# Patient Record
Sex: FEMALE | Race: BLACK OR AFRICAN AMERICAN | NOT HISPANIC OR LATINO | Employment: UNEMPLOYED | URBAN - METROPOLITAN AREA
[De-identification: names, ages, dates, MRNs, and addresses within clinical notes are randomized per-mention and may not be internally consistent; named-entity substitution may affect disease eponyms.]

---

## 2017-08-12 ENCOUNTER — GENERIC CONVERSION - ENCOUNTER (OUTPATIENT)
Dept: OTHER | Facility: OTHER | Age: 27
End: 2017-08-12

## 2017-08-23 ENCOUNTER — GENERIC CONVERSION - ENCOUNTER (OUTPATIENT)
Dept: OTHER | Facility: OTHER | Age: 27
End: 2017-08-23

## 2017-08-28 ENCOUNTER — GENERIC CONVERSION - ENCOUNTER (OUTPATIENT)
Dept: OTHER | Facility: OTHER | Age: 27
End: 2017-08-28

## 2017-08-30 ENCOUNTER — ALLSCRIPTS OFFICE VISIT (OUTPATIENT)
Dept: PERINATAL CARE | Facility: OTHER | Age: 27
End: 2017-08-30
Payer: COMMERCIAL

## 2017-08-30 ENCOUNTER — GENERIC CONVERSION - ENCOUNTER (OUTPATIENT)
Dept: OTHER | Facility: OTHER | Age: 27
End: 2017-08-30

## 2017-08-30 PROCEDURE — 76811 OB US DETAILED SNGL FETUS: CPT | Performed by: OBSTETRICS & GYNECOLOGY

## 2017-08-31 ENCOUNTER — GENERIC CONVERSION - ENCOUNTER (OUTPATIENT)
Dept: OTHER | Facility: OTHER | Age: 27
End: 2017-08-31

## 2017-09-15 ENCOUNTER — GENERIC CONVERSION - ENCOUNTER (OUTPATIENT)
Dept: OTHER | Facility: OTHER | Age: 27
End: 2017-09-15

## 2017-09-18 ENCOUNTER — GENERIC CONVERSION - ENCOUNTER (OUTPATIENT)
Dept: OTHER | Facility: OTHER | Age: 27
End: 2017-09-18

## 2018-01-12 NOTE — MISCELLANEOUS
Message  received call from [facility] at [facility] fetal heart program  requested report from [facility] to be forwarded to Dr Andrey Mohs  report received and forwarded to Dr Andrey Mohs at [facility] office for Dr Andrey Mohs to call Lemon Goltz  Active Problems    1  Encounter for screening for risk of pre-term labor (V28 82) (Z36)   2   Fetal cardiac anomaly complicating pregnancy, antepartum (763 23) (O35 8XX0)    Signatures   Electronically signed by : Yessenia Roberts, ; Sep 18 2017  1:15PM EST                       (Author)

## 2018-01-16 NOTE — PROGRESS NOTES
AUG 30 2017         RE: Maranda Andres                                To: Campos JOSIAH Langford    MR#: [id number]                                  [address]   : [address], [address]   SS#:                                              Fax: Cecille Guerra: [phone]   (Exam #: [id number])      The LMP of this 28year old,  Sukumar Mistry, P6-0-0-6 patient was unknown, her   working JUDY is  and the current gestational age is 24 weeks 5   days by [address] [place] [address]  A sonographic examination was performed on AUG   30 2017 using real time equipment  The ultrasound examination was   performed using abdominal technique  The patient has a BMI of 25 7  Earliest ultrasound found in her record: MFM 17  19w5d  JUDY 18      Cardiac motion was observed at 151 bpm       INDICATIONS      confirm gestational age      Exam Types      LEVEL II      RESULTS      Fetus # 1 of 1   Variable presentation   Placenta Location = Posterior   No placenta previa   Placenta Grade = 0      MEASUREMENTS (* Included In Average GA)      AC              14 2 cm        19 weeks 1 day * (43%)   BPD              4 5 cm        19 weeks 5 days* (52%)   HC              17 2 cm        19 weeks 4 days* (46%)   Femur            3 3 cm        20 weeks 3 days* (54%)      Nuchal Fold      4 9 mm   NBL              5 1 mm      Humerus          2 9 cm        19 weeks 4 days  (47%)      Cerebellum       2 0 cm        20 weeks 1 day   Biorbit          3 1 cm        20 weeks 1 day   CisternaMagna    3 4 mm      HC/AC           1 21   FL/AC           0 23   FL/BPD          0 72   EFW (Ac/Fl/Hc)   313 grams - 0 lbs 11 oz      THE AVERAGE GESTATIONAL AGE is 19 weeks 5 days +/- 10 days        AMNIOTIC FLUID         Largest Vertical Pocket = 6 9 cm   Amniotic Fluid: Normal      ANATOMY      Head                                    Normal   Face/Neck Normal   Th  Cav  Normal   Heart                                   Abnormal   Abd  Cav  Normal   Stomach                                 Normal   Right Kidney                            Normal   Left Kidney                             Normal   Bladder                                 Normal   Abd  Wall                               Normal   Spine                                   Normal   Extrems                                 See Details   Genitalia                               Normal   Placenta                                Normal   Umbl  Cord                              Normal   Uterus                                  Normal   PCI                                     Normal      ANATOMY DETAILS      Visualized Appearing Sonographically Normal:   HEAD: (Calvarium, BPD Level, Cavum, Lateral Ventricles, Choroid Plexus,   Cerebellum, Cisterna Magna);    FACE/NECK: (Neck, Nuchal Fold, Profile,   Orbits, Nose/Lips, Palate, Face);    TH  CAV  : (Lungs, Diaphragm); HEART: (Proximal Right Outflow, Ductal Arch, IVC, SVC, Cardiac Axis,   Cardiac Position);    ABD  CAV : (Liver);    STOMACH, RIGHT KIDNEY, LEFT   KIDNEY, BLADDER, ABD  WALL, SPINE: (Cervical Spine, Thoracic Spine, Lumbar   Spine, Sacrum);    EXTREMS: (Lt Humerus, Rt Humerus, Lt Forearm, Rt   Forearm, Lt Hand, Rt Hand, Lt Femur, Rt Femur, Lt Low Leg, Rt Low Leg);      GENITALIA, PLACENTA, UMBL  CORD, UTERUS, PCI      Visualized:   EXTREMS: (Lt Foot, Rt Foot)      Not Visualized:   HEART: (3VV, Short [place] of Greater Vessels, Aortic Arch, Interventricular   Septum, Interatrial Septum)      Abnormal:   HEART: (Four Chamber View, Proximal Left Outflow, 3 Vessel Trachea)      ADNEXA      The left ovary appeared normal and measured 3 1 x 3 0 x 2 0 cm with a   volume of 9 7 cc  The right ovary appeared normal and measured 3 7 x 2 8 x   1 9 cm with a volume of 10 3 cc        HARVINDER Elder IUP   19 weeks and 5 days by this ultrasound  (JUDY=2018)   19 weeks and 5 days by 2nd Trimester Sono  (JUDY=2018)   Variable presentation   Regular fetal heart rate of 151 bpm   Hypoplastic left ventricle   Posterior placenta   No placenta previa      CONSULT COMMENT      Thank you very much for your kind referral of Bhupendra Chacko to the   [address] [place] [address] on 2017 for level II   ultrasound evaluation and M consult  Sharath Pearson is a 25-year-old    female  7 para 2811 with uncertain menstrual dating  She presents   for fetal ultrasound evaluation to evaluate gestational age, with   consultation performed for the indication of a suspected fetal cardiac   abnormality  Sharath Pearson does not speaking [place]  Her native language is   [place]  She declined use of the telephone  service for   discussion today, instead opting for interpretation by her   Her   prenatal course so far has been unremarkable  She has no complaints  Sharath Pearson reports fetal movement and denies vaginal bleeding  Sharath Pearson has a history of 6 prior vaginal deliveries at term following   apparently uncomplicated prenatal courses  Her children range in age from   one year to 15years old  Each of her children is currently healthy  Her   past medical and surgical histories are unremarkable  She currently takes   no medication with the exception of a prenatal vitamin on a daily basis   and has no known drug allergy  The family genetic history is   noncontributory  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  There is no forward flow   into a small, slit-like left ventricle  The right ventricle is normal in   appearance  The left ventricular outflow tract appears small  The RVOT   appears normal  The ductus arteriosus appears normal  There is no reverse   flow noted within the ductus    No fetal structural abnormality or   ultrasound marker for aneuploidy is otherwise identified on the Level II   ultrasound study today  Several cardiac views, along with the distal lower   extremities, are suboptimally imaged secondary to constraints related to   unfavorable fetal position  The predicted estimated due date based upon   today's study is 2018  The amniotic fluid volume is normal  A   collection of mixed echogenicities beneath the membrane anteriorly is   identified measuring 78 x 22 x 59 mm in size, likely representing a   resolving subchorionic bleed  The posterior placenta is otherwise normal   in appearance  Today's ultrasound findings and suggested follow-up were discussed in   detail with Clint Hogan and her   We have arranged an appointment for   fetal echocardiography with Pediatric Cardiology at the [address] next week  We discussed that hypoplastic left   heart syndrome is a lethal condition, and will require multi-stage surgery   for treatment after delivery  This is a ductal dependent lesion  Optimally, Clint Hogan will deliver her baby in the Special Delivery Unit at   [address] for proximity to all necessary caregivers immediately after birth  She will return for follow-up assessment of fetal growth and MFM   evaluation in the [facility], [facility]  at [facility] in 4 weeks  Additional recommendations will depend, in part, upon the findings at   fetal echocardiography in [place] next week  The finding of a large   subchorionic bleed is associated with an increased risk for adverse   pregnancy outcomes including  delivery, fetal growth restriction,   vaginal bleeding, and other manifestations related to abnormal placental   function  The face to face time, in addition to time spent discussing ultrasound   results, was approximately 30 minutes, greater than 50% of which was spent   during counseling and coordination of care  ANDREAS Guo M D     Maternal-Fetal Medicine Electronically signed 08/31/17 09:41

## 2018-01-17 NOTE — MISCELLANEOUS
Provider Comments  Provider Comments:   Jimbo Perry did not show up for her scheduled Level I ultrasound appointment in the [facility], [facility]  in [facility] on Monday morning, September 18, 2017, at 8:30 AM  Additionally, she did not call to reschedule this appointment        Signatures   Electronically signed by : JOSIAH Montoya ; Sep 18 2017  9:35AM EST                       (Author)

## 2019-01-08 ENCOUNTER — HOSPITAL ENCOUNTER (EMERGENCY)
Facility: HOSPITAL | Age: 29
Discharge: HOME | End: 2019-01-09
Attending: EMERGENCY MEDICINE | Admitting: EMERGENCY MEDICINE
Payer: COMMERCIAL

## 2019-01-08 DIAGNOSIS — R03.0 ELEVATED BLOOD PRESSURE READING: ICD-10-CM

## 2019-01-08 DIAGNOSIS — D64.9 ANEMIA: ICD-10-CM

## 2019-01-08 DIAGNOSIS — E87.6 HYPOKALEMIA: Primary | ICD-10-CM

## 2019-01-08 DIAGNOSIS — M79.10 MYALGIA: ICD-10-CM

## 2019-01-08 DIAGNOSIS — R00.2 PALPITATIONS: ICD-10-CM

## 2019-01-08 LAB
BASOPHILS # BLD AUTO: 0 THOUSANDS/ΜL (ref 0–0.1)
BASOPHILS NFR BLD AUTO: 1 % (ref 0–1)
EOSINOPHIL # BLD AUTO: 0.3 THOUSAND/ΜL (ref 0–0.4)
EOSINOPHIL NFR BLD AUTO: 4 % (ref 0–6)
ERYTHROCYTE [DISTWIDTH] IN BLOOD BY AUTOMATED COUNT: 20.9 %
HCT VFR BLD AUTO: 30.9 % (ref 36–46)
HGB BLD-MCNC: 10.2 G/DL (ref 12–16)
LYMPHOCYTES # BLD AUTO: 1.9 THOUSANDS/ΜL (ref 0.5–4)
LYMPHOCYTES NFR BLD AUTO: 29 % (ref 25–45)
MCH RBC QN AUTO: 25.4 PG (ref 26–34)
MCHC RBC AUTO-ENTMCNC: 33 G/DL (ref 31–36)
MCV RBC AUTO: 77 FL (ref 80–100)
MONOCYTES # BLD AUTO: 0.6 THOUSAND/ΜL (ref 0.2–0.9)
MONOCYTES NFR BLD AUTO: 9 % (ref 1–10)
NEUTROPHILS # BLD AUTO: 3.8 THOUSANDS/ΜL (ref 1.8–7.8)
NEUTS SEG NFR BLD AUTO: 58 % (ref 45–65)
PLATELET # BLD AUTO: 221 THOUSANDS/UL (ref 150–450)
PMV BLD AUTO: 8.6 FL (ref 8.9–12.7)
RBC # BLD AUTO: 4 MILLION/UL (ref 4–5.2)
WBC # BLD AUTO: 6.6 THOUSAND/UL (ref 4.5–11)

## 2019-01-08 PROCEDURE — 85025 COMPLETE CBC W/AUTO DIFF WBC: CPT | Performed by: EMERGENCY MEDICINE

## 2019-01-08 PROCEDURE — 36415 COLL VENOUS BLD VENIPUNCTURE: CPT | Performed by: EMERGENCY MEDICINE

## 2019-01-08 PROCEDURE — 87502 INFLUENZA DNA AMP PROBE: CPT | Performed by: EMERGENCY MEDICINE

## 2019-01-08 PROCEDURE — 80053 COMPREHEN METABOLIC PANEL: CPT | Performed by: EMERGENCY MEDICINE

## 2019-01-08 PROCEDURE — 93005 ELECTROCARDIOGRAM TRACING: CPT

## 2019-01-08 PROCEDURE — 84443 ASSAY THYROID STIM HORMONE: CPT | Performed by: EMERGENCY MEDICINE

## 2019-01-08 PROCEDURE — 82550 ASSAY OF CK (CPK): CPT | Performed by: EMERGENCY MEDICINE

## 2019-01-08 PROCEDURE — 99285 EMERGENCY DEPT VISIT HI MDM: CPT

## 2019-01-08 PROCEDURE — 84484 ASSAY OF TROPONIN QUANT: CPT | Performed by: EMERGENCY MEDICINE

## 2019-01-08 RX ORDER — LABETALOL HYDROCHLORIDE 5 MG/ML
INJECTION, SOLUTION INTRAVENOUS
Status: COMPLETED
Start: 2019-01-08 | End: 2019-01-08

## 2019-01-08 RX ORDER — LABETALOL 20 MG/4 ML (5 MG/ML) INTRAVENOUS SYRINGE
10 ONCE
Status: COMPLETED | OUTPATIENT
Start: 2019-01-08 | End: 2019-01-09

## 2019-01-08 RX ORDER — ACETAMINOPHEN 325 MG/1
650 TABLET ORAL ONCE
Status: COMPLETED | OUTPATIENT
Start: 2019-01-08 | End: 2019-01-08

## 2019-01-08 RX ADMIN — LABETALOL HYDROCHLORIDE 10 MG: 5 INJECTION INTRAVENOUS at 23:39

## 2019-01-08 RX ADMIN — ACETAMINOPHEN 650 MG: 325 TABLET ORAL at 23:15

## 2019-01-09 VITALS
RESPIRATION RATE: 19 BRPM | DIASTOLIC BLOOD PRESSURE: 83 MMHG | WEIGHT: 130 LBS | OXYGEN SATURATION: 99 % | SYSTOLIC BLOOD PRESSURE: 137 MMHG | HEART RATE: 76 BPM | HEIGHT: 65 IN | TEMPERATURE: 97 F | BODY MASS INDEX: 21.66 KG/M2

## 2019-01-09 LAB
ALBUMIN SERPL BCP-MCNC: 3.7 G/DL (ref 3–5.2)
ALP SERPL-CCNC: 46 U/L (ref 43–122)
ALT SERPL W P-5'-P-CCNC: 13 U/L (ref 9–52)
ANION GAP SERPL CALCULATED.3IONS-SCNC: 9 MMOL/L (ref 5–14)
ANISOCYTOSIS BLD QL SMEAR: PRESENT
AST SERPL W P-5'-P-CCNC: 17 U/L (ref 14–36)
ATRIAL RATE: 72 BPM
BILIRUB SERPL-MCNC: 0.2 MG/DL
BILIRUB UR QL STRIP: NEGATIVE
BUN SERPL-MCNC: 7 MG/DL (ref 5–25)
CALCIUM SERPL-MCNC: 8.9 MG/DL (ref 8.4–10.2)
CHLORIDE SERPL-SCNC: 105 MMOL/L (ref 97–108)
CK SERPL-CCNC: 119 U/L (ref 30–135)
CLARITY UR: CLEAR
CO2 SERPL-SCNC: 22 MMOL/L (ref 22–30)
COLOR UR: ABNORMAL
CREAT SERPL-MCNC: 0.63 MG/DL (ref 0.6–1.2)
FLUAV + FLUBV RNA ISLT NAA+PROBE: NOT DETECTED
FLUAV + FLUBV RNA ISLT NAA+PROBE: NOT DETECTED
GFR SERPL CREATININE-BSD FRML MDRD: 140 ML/MIN/1.73SQ M
GLUCOSE SERPL-MCNC: 91 MG/DL (ref 70–99)
GLUCOSE UR STRIP-MCNC: ABNORMAL MG/DL
HGB UR QL STRIP.AUTO: NEGATIVE
KETONES UR STRIP-MCNC: NEGATIVE MG/DL
LEUKOCYTE ESTERASE UR QL STRIP: NEGATIVE
NITRITE UR QL STRIP: NEGATIVE
P AXIS: 54 DEGREES
PH UR STRIP.AUTO: 7 [PH] (ref 4.5–8)
PLATELET BLD QL SMEAR: ADEQUATE
POTASSIUM SERPL-SCNC: 3.1 MMOL/L (ref 3.6–5)
PR INTERVAL: 196 MS
PROT SERPL-MCNC: 7.1 G/DL (ref 5.9–8.4)
PROT UR STRIP-MCNC: NEGATIVE MG/DL
QRS AXIS: 57 DEGREES
QRSD INTERVAL: 90 MS
QT INTERVAL: 398 MS
QTC INTERVAL: 435 MS
RBC MORPH BLD: NORMAL
SODIUM SERPL-SCNC: 136 MMOL/L (ref 137–147)
SP GR UR STRIP.AUTO: 1 (ref 1–1.04)
T WAVE AXIS: 23 DEGREES
TROPONIN I SERPL-MCNC: <0.01 NG/ML (ref 0–0.03)
TSH SERPL DL<=0.05 MIU/L-ACNC: 1.15 UIU/ML (ref 0.47–4.68)
UROBILINOGEN UA: NEGATIVE MG/DL
VENTRICULAR RATE: 72 BPM

## 2019-01-09 PROCEDURE — 87147 CULTURE TYPE IMMUNOLOGIC: CPT | Performed by: EMERGENCY MEDICINE

## 2019-01-09 PROCEDURE — 96361 HYDRATE IV INFUSION ADD-ON: CPT

## 2019-01-09 PROCEDURE — 93010 ELECTROCARDIOGRAM REPORT: CPT | Performed by: INTERNAL MEDICINE

## 2019-01-09 PROCEDURE — 96374 THER/PROPH/DIAG INJ IV PUSH: CPT

## 2019-01-09 PROCEDURE — 87086 URINE CULTURE/COLONY COUNT: CPT | Performed by: EMERGENCY MEDICINE

## 2019-01-09 PROCEDURE — 81003 URINALYSIS AUTO W/O SCOPE: CPT | Performed by: EMERGENCY MEDICINE

## 2019-01-09 RX ORDER — POTASSIUM CHLORIDE 750 MG/1
40 TABLET, EXTENDED RELEASE ORAL ONCE
Status: COMPLETED | OUTPATIENT
Start: 2019-01-09 | End: 2019-01-09

## 2019-01-09 RX ADMIN — LABETALOL 20 MG/4 ML (5 MG/ML) INTRAVENOUS SYRINGE 10 MG: at 00:21

## 2019-01-09 RX ADMIN — POTASSIUM CHLORIDE 40 MEQ: 10 TABLET, EXTENDED RELEASE ORAL at 00:54

## 2019-01-09 RX ADMIN — SODIUM CHLORIDE 1000 ML: 9 INJECTION, SOLUTION INTRAVENOUS at 00:00

## 2019-01-09 NOTE — DISCHARGE INSTRUCTIONS
Please follow up with OBGYN as directed tomorrow at 13:00 for further care, if your symptoms worsen please return to the emergency department  Anemia   WHAT YOU NEED TO KNOW:   Anemia is a low number of red blood cells or a low amount of hemoglobin in your red blood cells  Hemoglobin is a protein that helps carry oxygen throughout your body  Red blood cells use iron to create hemoglobin  Anemia may develop if your body does not have enough iron  It may also develop if your body does not make enough red blood cells or they die faster than your body can make them  DISCHARGE INSTRUCTIONS:   Call 911 or have someone call 911 for any of the following:   · You lose consciousness  · You have severe chest pain  Return to the emergency department if:   · You have dark or bloody bowel movements  Contact your healthcare provider if:   · Your symptoms are worse, even after treatment  · You have questions or concerns about your condition or care  Medicines:   · Iron or folic acid supplements  help increase your red blood cell and hemoglobin levels  · Vitamin B12 injections  may help boost your red blood cell level and decrease your symptoms  Ask your healthcare provider how to inject B12  · Take your medicine as directed  Contact your healthcare provider if you think your medicine is not helping or if you have side effects  Tell him of her if you are allergic to any medicine  Keep a list of the medicines, vitamins, and herbs you take  Include the amounts, and when and why you take them  Bring the list or the pill bottles to follow-up visits  Carry your medicine list with you in case of an emergency  Prevent anemia:  Eat healthy foods rich in iron and vitamin C  Nuts, meat, dark leafy green vegetables, and beans are high in iron and protein  Vitamin C helps your body absorb iron  Foods rich in vitamin C include oranges and other citrus fruits   Ask your healthcare provider for a list of other foods that are high in iron or vitamin C  Ask if you need to be on a special diet  Follow up with your healthcare provider as directed:  Write down your questions so you remember to ask them during your visits  © 2017 [employer]  Information is for End User's use only and may not be sold, redistributed or otherwise used for commercial purposes  All illustrations and images included in CareNotes® are the copyrighted property of A D A M , Inc  or [employer]  The above information is an  only  It is not intended as medical advice for individual conditions or treatments  Talk to your doctor, nurse or pharmacist before following any medical regimen to see if it is safe and effective for you  Heart Palpitations   WHAT YOU NEED TO KNOW:   Heart palpitations are feelings that your heart races, jumps, throbs, or flutters  You may feel extra beats, no beats for a short time, or skipped beats  You may have these feelings in your chest, throat, or neck  They may happen when you are sitting, standing, or lying  Heart palpitations may be frightening, but are usually not caused by a serious problem  DISCHARGE INSTRUCTIONS:   Call 911 or have someone else call for any of the following:   · You have any of the following signs of a heart attack:      ¨ Squeezing, pressure, or pain in your chest that lasts longer than 5 minutes or returns    ¨ Discomfort or pain in your back, neck, jaw, stomach, or arm     ¨ Trouble breathing    ¨ Nausea or vomiting    ¨ Lightheadedness or a sudden cold sweat, especially with chest pain or trouble breathing    · You have any of the following signs of a stroke:      ¨ Numbness or drooping on one side of your face     ¨ Weakness in an arm or leg    ¨ Confusion or difficulty speaking    ¨ Dizziness, a severe headache, or vision loss    · You faint or lose consciousness    Return to the emergency department if:   · Your palpitations happen more often or get more intense  Contact your healthcare provider if:   · You have new or worsening swelling in your feet or ankles  · You have questions or concerns about your condition or care  Follow up with your healthcare provider as directed: You may need to follow up with a cardiologist  Vitayl Geller may need tests to check for heart problems that cause palpitations  Write down your questions so you remember to ask them during your visits  Keep a record:  Write down when your palpitations start and stop, what you were doing when they started, and your symptoms  Keep track of what you ate or drank within a few hours of your palpitations  Include anything that seemed to help your symptoms, such as lying down or holding your breath  This record will help you and your healthcare provider learn what triggers your palpitations  Bring this record with you to your follow up visits  Help prevent heart palpitations:   · Manage stress and anxiety  Find ways to relax such as listening to music, meditating, or doing yoga  Exercise can also help decrease stress and anxiety  Talk to someone you trust about your stress or anxiety  You can also talk to a therapist      · Get plenty of sleep every night  Ask your healthcare provider how much sleep you need each night  · Do not drink caffeine or alcohol  Caffeine and alcohol can make your palpitations worse  Caffeine is found in soda, coffee, tea, chocolate, and drinks that increase your energy  · Do not smoke  Nicotine and other chemicals in cigarettes and cigars may damage your heart and blood vessels  Ask your healthcare provider for information if you currently smoke and need help to quit  E-cigarettes or smokeless tobacco still contain nicotine  Talk to your healthcare provider before you use these products  · Do not use illegal drugs  Talk to your healthcare provider if you use illegal drugs and want help to quit    © 2017 [employer] Information is for End User's use only and may not be sold, redistributed or otherwise used for commercial purposes  All illustrations and images included in CareNotes® are the copyrighted property of A D A M , Inc  or [employer] Hernandez  The above information is an  only  It is not intended as medical advice for individual conditions or treatments  Talk to your doctor, nurse or pharmacist before following any medical regimen to see if it is safe and effective for you

## 2019-01-09 NOTE — ED PROVIDER NOTES
History  Chief Complaint   Patient presents with    Vomiting     Per EMS call came in as chest pain, but her son called who is 15years old stating mother is vomiting and is 3 months pregnant  Per EMS this is child #8   Chest Pain     74-year-old female  at approximately 16 weeks gestation presents for evaluation of multiple symptoms including chest pain, headache, palpitations, nausea vomiting, bilateral thigh discomfort  Patient is a poor historian and history provided via [employer]   Despite that the patient was not answering some of the directed questions and seemed to be vague about his symptoms     She states that the symptoms started approximately 3 days ago have been constant without any relieving exacerbating factors  No similar symptoms in the past although patient states that she has been nauseous and been having vomiting up to 3 to 4 times a day her entire pregnancy  She has been able to tolerate some oral intake, weight gain  No fevers at home and no known sick contacts  She did not take any medications for this prior to arrival   Denies crying abdominal pain, vaginal bleeding or discharge, frequency urgency dysuria     Patient moved to the [place] in October and has not seen an OBGYN here for this pregnancy  She states she does have a history of high blood pressure but does not take any medications for this  No preeclampsia or problems in her previous pregnancies    She does have 1 miscarriage in the past otherwise uneventful pregnancies        Vomiting   Associated symptoms: myalgias    Associated symptoms: no abdominal pain, no cough, no diarrhea, no fever, no headaches and no sore throat    Chest Pain   Associated symptoms: nausea and vomiting    Associated symptoms: no abdominal pain, no back pain, no cough, no dizziness, no fever, no headache, no palpitations and no weakness        None       Past Medical History:   Diagnosis Date    Hypertension        History reviewed  No pertinent surgical history  History reviewed  No pertinent family history  I have reviewed and agree with the history as documented  Social History   Substance Use Topics    Smoking status: Never Smoker    Smokeless tobacco: Never Used    Alcohol use No        Review of Systems   Constitutional: Negative for appetite change and fever  HENT: Negative for rhinorrhea and sore throat  Eyes: Negative for photophobia and visual disturbance  Respiratory: Negative for cough, chest tightness and wheezing  Cardiovascular: Positive for chest pain  Negative for palpitations and leg swelling  Gastrointestinal: Positive for nausea and vomiting  Negative for abdominal distention, abdominal pain, blood in stool, constipation and diarrhea  Genitourinary: Negative for dysuria, flank pain, frequency, hematuria and urgency  Musculoskeletal: Positive for myalgias  Negative for back pain  Skin: Negative for rash  Neurological: Negative for dizziness, weakness and headaches  All other systems reviewed and are negative  Physical Exam  Physical Exam   Constitutional: She is oriented to person, place, and time  She appears well-developed and well-nourished  HENT:   Head: Normocephalic and atraumatic  Eyes: Pupils are equal, round, and reactive to light  Conjunctivae and EOM are normal    Neck: Normal range of motion  Neck supple  Cardiovascular: Normal rate and regular rhythm  Exam reveals no gallop and no friction rub  No murmur heard  Pulmonary/Chest: Effort normal  No respiratory distress  She has no wheezes  She has no rales  She exhibits no tenderness  Abdominal: Soft  She exhibits no distension and no mass  There is no tenderness  There is no rebound and no guarding  nontender abdomen, uterus felt below umbilicus, single intrauterine gestation BPD 16 weeks via ultrasound,    Musculoskeletal: Normal range of motion  She exhibits no edema or deformity     Neurological: She is alert and oriented to person, place, and time  Skin: Skin is warm and dry  Capillary refill takes less than 2 seconds  Psychiatric: She has a normal mood and affect  Nursing note and vitals reviewed  Vital Signs  ED Triage Vitals   Temperature Pulse Respirations Blood Pressure SpO2   01/08/19 2116 01/08/19 2116 01/08/19 2116 01/08/19 2116 01/08/19 2116   (!) 97 °F (36 1 °C) 73 16 170/97 100 %      Temp Source Heart Rate Source Patient Position - Orthostatic VS BP Location FiO2 (%)   01/08/19 2116 01/08/19 2116 01/08/19 2116 01/08/19 2116 --   Tympanic Monitor Lying Left arm       Pain Score       01/08/19 2218       5           Vitals:    01/09/19 0000 01/09/19 0015 01/09/19 0030 01/09/19 0045   BP: 146/86 150/82 143/81 137/83   Pulse: 68 74 73 76   Patient Position - Orthostatic VS:           Visual Acuity      ED Medications  Medications   acetaminophen (TYLENOL) tablet 650 mg (650 mg Oral Given 1/8/19 2315)   sodium chloride 0 9 % bolus 1,000 mL (0 mL Intravenous Stopped 1/9/19 0053)   Labetalol HCl (NORMODYNE) injection 10 mg (10 mg Intravenous Given 1/9/19 0021)   labetalol (NORMODYNE) 5 mg/mL injection **ADS Override Pull** (10 mg  Given 1/8/19 2339)   potassium chloride (K-DUR,KLOR-CON) CR tablet 40 mEq (40 mEq Oral Given 1/9/19 0054)       Diagnostic Studies  Results Reviewed     Procedure Component Value Units Date/Time    TSH [[id number]]  (Normal) Collected:  01/08/19 2338    Lab Status:  Final result Specimen:  Blood from Arm, Right Updated:  01/09/19 0030     TSH 3RD GENERATON 1 150 uIU/mL     Narrative:         Patients undergoing fluorescein dye angiography may retain small amounts of fluorescein in the body for 48-72 hours post procedure  Samples containing fluorescein can produce falsely depressed TSH values  If the patient had this procedure,a specimen should be resubmitted post fluorescein clearance            The recommended reference ranges for TSH during pregnancy are as follows:  First trimester 0 1 to 2 5 uIU/mL  Second trimester  0 2 to 3 0 uIU/mL  Third trimester 0 3 to 3 0 uIU/m      UA w Reflex to Microscopic w Reflex to Culture [[id number]]  (Abnormal) Collected:  01/09/19 0024    Lab Status:  Final result Specimen:  Urine from Urine, Clean Catch Updated:  01/09/19 0030     Color, UA Straw     Clarity, UA Clear     Specific [place], UA 1 005     pH, UA 7 0     Leukocytes, UA Negative     Nitrite, UA Negative     Protein, UA Negative mg/dl      Glucose,  (1/4%) (A) mg/dl      Ketones, UA Negative mg/dl      Bilirubin, UA Negative     Blood, UA Negative     URINE COMMENT --     UROBILINOGEN UA Negative mg/dL     Urine culture [[id number]] Collected:  01/09/19 0024    Lab Status:   In process Specimen:  Urine from Urine, Clean Catch Updated:  01/09/19 0030    Troponin I [[id number]]  (Normal) Collected:  01/08/19 2342    Lab Status:  Final result Specimen:  Blood from Arm, Right Updated:  01/09/19 0011     Troponin I <0 01 ng/mL     Rapid Flu-Viral RNA amplification [facility] HEART ONLY) [[id number]]  (Normal) Collected:  01/08/19 2338    Lab Status:  Final result Specimen:  Nares from Nose Updated:  01/09/19 0011     INFLUENZA A AMPLIFIED RNA Not Detected     INFLUENZA B AMPLIFIED Not Detected    Comprehensive metabolic panel [[id number]]  (Abnormal) Collected:  01/08/19 2338    Lab Status:  Final result Specimen:  Blood from Arm, Right Updated:  01/09/19 0001     Sodium 136 (L) mmol/L      Potassium 3 1 (L) mmol/L      Chloride 105 mmol/L      CO2 22 mmol/L      ANION GAP 9 mmol/L      BUN 7 mg/dL      Creatinine 0 63 mg/dL      Glucose 91 mg/dL      Calcium 8 9 mg/dL      AST 17 U/L      ALT 13 U/L      Alkaline Phosphatase 46 U/L      Total Protein 7 1 g/dL      Albumin 3 7 g/dL      Total Bilirubin 0 20 mg/dL      eGFR 140 ml/min/1 73sq m     Narrative:         National Kidney Disease Education Program recommendations are as follows:  GFR calculation is accurate only with a steady state creatinine  Chronic Kidney disease less than 60 ml/min/1 73 sq  meters  Kidney failure less than 15 ml/min/1 73 sq  meters  CK (with reflex to MB) [[id number]]  (Normal) Collected:  01/08/19 2338    Lab Status:  Final result Specimen:  Blood from Arm, Right Updated:  01/09/19 0001     Total  U/L     CBC and differential [[id number]]  (Abnormal) Collected:  01/08/19 2338    Lab Status:  Final result Specimen:  Blood from Arm, Right Updated:  01/08/19 2354     WBC 6 60 Thousand/uL      RBC 4 00 Million/uL      Hemoglobin 10 2 (L) g/dL      Hematocrit 30 9 (L) %      MCV 77 (L) fL      MCH 25 4 (L) pg      MCHC 33 0 g/dL      RDW 20 9 (H) %      MPV 8 6 (L) fL      Platelets 029 Thousands/uL      Neutrophils Relative 58 %      Lymphocytes Relative 29 %      Monocytes Relative 9 %      Eosinophils Relative 4 %      Basophils Relative 1 %      Neutrophils Absolute 3 80 Thousands/µL      Lymphocytes Absolute 1 90 Thousands/µL      Monocytes Absolute 0 60 Thousand/µL      Eosinophils Absolute 0 30 Thousand/µL      Basophils Absolute 0 00 Thousands/µL                  No orders to display              Procedures  Procedures       Phone Contacts  ED Phone Contact    ED Course  ED Course as of Jan 09 0158 Tue Jan 08, 2019   225 Procedure Note: EKG  Date/Time: 01/08/19 10:58 PM   Performed by: Sammi Campbell  Authorized by: Sammi Campbell  Indications / Diagnosis: CP  ECG reviewed by me, the ED Provider: yes   The EKG demonstrates:  Rhythm: normal sinus  Intervals: normal intervals  Axis: normal axis  QRS/Blocks: normal QRS  ST Changes: No acute ST Changes, no STD/LINCOLN         2310 Spoke to Dr Alysia Arana , OB/gyn @ [facility], will get labwork, and speak with Ob again, arrange followup   If need to treat BP for a goal of 140/90 will use Iv Labetalol    2343 US  single intrauterine gestration BPD 16 weeks,     Wed Jan 09, 2019   0040 Spoke to Dr Alysia Arana, pt feeling better, will follow up with Ob clinic at 1 pm tomorrow  No further episodes of vomiting, blood pressure improved, heart rate 70s with 100% saturation and no current chest pain  Symptoms and likely secondary to possible viral syndrome  Will treat hypokalemia and have her follow up with OBGYN for further care                                MDM  Number of Diagnoses or Management Options  Anemia:   Elevated blood pressure reading:   Hypokalemia:   Myalgia:   Palpitations:   Diagnosis management comments: 79-year-old female  with multiple complaints presents, approximately 16 weeks via ultrasound measurements will speak to OBGYN for further recommendations       Amount and/or Complexity of Data Reviewed  Clinical lab tests: ordered and reviewed  Tests in the medicine section of CPT®: ordered and reviewed  Discuss the patient with other providers: yes  Independent visualization of images, tracings, or specimens: yes      CritCare Time    Disposition  Final diagnoses:   Hypokalemia   Anemia   Palpitations   Myalgia   Elevated blood pressure reading     Time reflects when diagnosis was documented in both MDM as applicable and the Disposition within this note     Time User Action Codes Description Comment    2019 12:41 AM Ocampo Oar Add [E87 6] Hypokalemia     2019 12:41 AM Ocampo Oar Add [D64 9] Anemia     2019 12:41 AM Ocampo Oar Add [R00 2] Palpitations     2019 12:41 AM Ocampo Oar Add [M79 10] Myalgia     2019 12:43 AM Ocampo Oar Add [R03 0] Elevated blood pressure reading       ED Disposition     ED Disposition Condition Comment    Discharge  0456 Laly Char discharge to home/self care      Condition at discharge: Stable        Follow-up Information     Follow up With Specialties Details Why Contact Info Additional [address] Heart Emergency Department Emergency Medicine  If symptoms worsen [address]  [facility]  [address]  [address] Obstetrics and Gynecology  go to appointment @ 1 pm as scheduled [place] [address]  [address], [address], [address]          There are no discharge medications for this patient  No discharge procedures on file      ED Provider  Electronically Signed by           Oswaldo Doran MD  01/09/19 5080

## 2019-01-09 NOTE — ED NOTES
In an attempt to talk to the patient we have gotten 2 translation ipads to the department and when we tried to connect to the internet we are not able to connect to the wifi       Denys Mao RN  01/08/19 5156

## 2019-01-09 NOTE — ED NOTES
Pt ambulated to bedside toilet without difficulty  Pt placed on cardiac monitor   NSR on cardiac monitor     Hoda Bonner RN  01/08/19 4688

## 2019-01-10 ENCOUNTER — TELEPHONE (OUTPATIENT)
Dept: OBGYN CLINIC | Facility: CLINIC | Age: 29
End: 2019-01-10

## 2019-01-10 LAB — BACTERIA UR CULT: ABNORMAL

## 2019-01-10 NOTE — TELEPHONE ENCOUNTER
Called patient and offered appointment to start prenatal care  Patient's  stated that he will call back to scheduled appointment

## 2022-10-25 ENCOUNTER — HOSPITAL ENCOUNTER (EMERGENCY)
Facility: HOSPITAL | Age: 32
Discharge: HOME | End: 2022-10-25
Attending: EMERGENCY MEDICINE
Payer: COMMERCIAL

## 2022-10-25 VITALS
WEIGHT: 149.25 LBS | DIASTOLIC BLOOD PRESSURE: 89 MMHG | SYSTOLIC BLOOD PRESSURE: 154 MMHG | OXYGEN SATURATION: 100 % | HEART RATE: 69 BPM | TEMPERATURE: 98.5 F | RESPIRATION RATE: 18 BRPM | BODY MASS INDEX: 24.84 KG/M2

## 2022-10-25 DIAGNOSIS — N89.8 VAGINAL DISCHARGE: Primary | ICD-10-CM

## 2022-10-25 DIAGNOSIS — B37.31 VAGINAL CANDIDIASIS: ICD-10-CM

## 2022-10-25 LAB
BILIRUB UR QL STRIP: NEGATIVE
CLARITY UR: CLEAR
COLOR UR: YELLOW
EXT PREG TEST URINE: NEGATIVE
EXT. CONTROL ED NAV: NORMAL
GLUCOSE UR STRIP-MCNC: NEGATIVE MG/DL
HGB UR QL STRIP.AUTO: NEGATIVE
KETONES UR STRIP-MCNC: NEGATIVE MG/DL
LEUKOCYTE ESTERASE UR QL STRIP: NEGATIVE
NITRITE UR QL STRIP: NEGATIVE
PH UR STRIP.AUTO: 6 [PH] (ref 4.5–8)
PROT UR STRIP-MCNC: NEGATIVE MG/DL
SP GR UR STRIP.AUTO: 1.02 (ref 1–1.03)
UROBILINOGEN UR QL STRIP.AUTO: 0.2 E.U./DL

## 2022-10-25 PROCEDURE — 87510 GARDNER VAG DNA DIR PROBE: CPT | Performed by: PHYSICIAN ASSISTANT

## 2022-10-25 PROCEDURE — 87660 TRICHOMONAS VAGIN DIR PROBE: CPT | Performed by: PHYSICIAN ASSISTANT

## 2022-10-25 PROCEDURE — 87480 CANDIDA DNA DIR PROBE: CPT | Performed by: PHYSICIAN ASSISTANT

## 2022-10-25 PROCEDURE — 99284 EMERGENCY DEPT VISIT MOD MDM: CPT | Performed by: PHYSICIAN ASSISTANT

## 2022-10-25 PROCEDURE — 81025 URINE PREGNANCY TEST: CPT | Performed by: PHYSICIAN ASSISTANT

## 2022-10-25 PROCEDURE — 81003 URINALYSIS AUTO W/O SCOPE: CPT

## 2022-10-25 RX ORDER — FLUCONAZOLE 150 MG/1
150 TABLET ORAL ONCE
Qty: 1 TABLET | Refills: 0 | Status: SHIPPED | OUTPATIENT
Start: 2022-10-28 | End: 2022-10-28

## 2022-10-25 RX ORDER — FLUCONAZOLE 150 MG/1
150 TABLET ORAL ONCE
Status: COMPLETED | OUTPATIENT
Start: 2022-10-25 | End: 2022-10-25

## 2022-10-25 RX ADMIN — FLUCONAZOLE 150 MG: 150 TABLET ORAL at 12:02

## 2022-10-25 NOTE — ED PROVIDER NOTES
History  Chief Complaint   Patient presents with   • Vaginal Discharge     Pt c/o vaginal rash with itching  and white discharge that started yesterday      Patient is a 50-year-old female with no significant past medical history who presents with vaginal itching and discharge since yesterday  Patient describes vaginal itching with a white thick discharge that is minimal that started yesterday  She denies any external rash, new lotions, creams, products, clothing, vaginal bleeding, vaginal lesions, abdominal pain, concern for STDs, previous similar symptoms, dysuria, hematuria urinary frequency or urgency  Patient's last menstrual period was 10/7  Patient has not tried anything to help alleviate symptoms  Patient states she is otherwise in her usual state of health and denies any fevers, chills, diaphoresis, headache, congestion, cough, shortness of breath, chest pain, nausea, vomiting, diarrhea, constipation  None       Past Medical History:   Diagnosis Date   • Hypertension        History reviewed  No pertinent surgical history  History reviewed  No pertinent family history  I have reviewed and agree with the history as documented  E-Cigarette/Vaping     E-Cigarette/Vaping Substances     Social History     Tobacco Use   • Smoking status: Never Smoker   • Smokeless tobacco: Never Used   Substance Use Topics   • Alcohol use: No   • Drug use: No       Review of Systems   Constitutional: Negative for chills, diaphoresis and fever  HENT: Negative for congestion  Respiratory: Negative for cough and shortness of breath  Cardiovascular: Negative for chest pain  Gastrointestinal: Negative for abdominal pain, diarrhea, nausea and vomiting  Genitourinary: Positive for vaginal discharge  Negative for decreased urine volume, difficulty urinating, dysuria, flank pain, frequency, genital sores, hematuria, pelvic pain, urgency, vaginal bleeding and vaginal pain     Skin: Negative for color change, pallor and rash  Neurological: Negative for dizziness, light-headedness and headaches  All other systems reviewed and are negative  Physical Exam  Physical Exam  Vitals and nursing note reviewed  Exam conducted with a chaperone present  Constitutional:       General: She is awake  She is not in acute distress  Appearance: She is well-developed  She is not ill-appearing, toxic-appearing or diaphoretic  HENT:      Head: Normocephalic and atraumatic  Right Ear: External ear normal       Left Ear: External ear normal       Nose: Nose normal       Mouth/Throat:      Mouth: Mucous membranes are moist    Eyes:      Conjunctiva/sclera: Conjunctivae normal       Pupils: Pupils are equal, round, and reactive to light  Cardiovascular:      Rate and Rhythm: Normal rate and regular rhythm  Heart sounds: Normal heart sounds  Pulmonary:      Effort: Pulmonary effort is normal  No respiratory distress  Breath sounds: Normal breath sounds  No stridor  No decreased breath sounds or wheezing  Abdominal:      General: Bowel sounds are normal  There is no distension  Palpations: Abdomen is soft  Tenderness: There is no abdominal tenderness  There is no right CVA tenderness, left CVA tenderness, guarding or rebound  Genitourinary:     Exam position: Supine  Musculoskeletal:         General: Normal range of motion  Cervical back: Normal range of motion and neck supple  Skin:     General: Skin is warm and dry  Capillary Refill: Capillary refill takes less than 2 seconds  Neurological:      General: No focal deficit present  Mental Status: She is alert and oriented to person, place, and time  GCS: GCS eye subscore is 4  GCS verbal subscore is 5  GCS motor subscore is 6  Psychiatric:         Behavior: Behavior is cooperative           Vital Signs  ED Triage Vitals [10/25/22 0941]   Temperature Pulse Respirations Blood Pressure SpO2   98 5 °F (36 9 °C) 70 18 140/83 100 %      Temp src Heart Rate Source Patient Position - Orthostatic VS BP Location FiO2 (%)   -- -- Sitting Right arm --      Pain Score       --           Vitals:    10/25/22 0941 10/25/22 1202   BP: 140/83 154/89   Pulse: 70 69   Patient Position - Orthostatic VS: Sitting Standing         Visual Acuity      ED Medications  Medications   fluconazole (DIFLUCAN) tablet 150 mg (150 mg Oral Given 10/25/22 1202)       Diagnostic Studies  Results Reviewed     Procedure Component Value Units Date/Time    VAGINOSIS DNA PROBE (AFFIRM) [[id number]] Collected: 10/25/22 1147    Lab Status: In process Specimen: Genital from Vaginal Updated: 10/25/22 1152    POCT pregnancy, urine [[id number]]  (Normal) Resulted: 10/25/22 1133    Lab Status: Final result Updated: 10/25/22 1133     EXT PREG TEST UR (Ref: Negative) negative     Control valid    Urine Macroscopic, POC [[id number]] Collected: 10/25/22 1125    Lab Status: Final result Specimen: Urine Updated: 10/25/22 1131     Color, UA Yellow     Clarity, UA Clear     pH, UA 6 0     Leukocytes, UA Negative     Nitrite, UA Negative     Protein, UA Negative mg/dl      Glucose, UA Negative mg/dl      Ketones, UA Negative mg/dl      Urobilinogen, UA 0 2 E U /dl      Bilirubin, UA Negative     Occult Blood, UA Negative     Specific Gravity, UA 1 025    Narrative:      CLINITEK RESULT                 No orders to display              Procedures  Procedures         ED Course  ED Course as of 10/25/22 1339   Tue Oct 25, 2022   1134 PREGNANCY TEST URINE: negative                                             MDM  Number of Diagnoses or Management Options  Vaginal candidiasis  Vaginal discharge  Diagnosis management comments: Reviewed results with patient and significant other at bedside  Patient's significant other provided language translation as needed  Reviewed medication education, treatment at home  Informed that we will call in 2-3 days with results of vaginosis testing    Recommended follow-up with OBGYN for further evaluation and monitoring of symptoms  The management plan was discussed in detail with the patient and significant other at bedside and all questions were answered  Provided both verbal and written instructions  Reviewed red flag symptoms and strict return to ED instructions  Patient and significant other notes understanding and agrees to plan  Disposition  Final diagnoses:   Vaginal discharge   Vaginal candidiasis     Time reflects when diagnosis was documented in both MDM as applicable and the Disposition within this note     Time User Action Codes Description Comment    10/25/2022 11:50 AM Perfecto Chant Add [N89 8] Vaginal discharge     10/25/2022 11:50 AM Perfecto Chant Add [B37 31] Vaginal candidiasis       ED Disposition     ED Disposition   Discharge    Condition   Stable    Date/Time   Tue Oct 25, 2022 11:50 AM    Comment   0785 Laly Pardo discharge to home/self care  Follow-up Information     Follow up With Specialties Details Why Contact Info Additional Information    [address] [facility] Emergency Medicine  If symptoms worsen [place] [address]  [address] [place] [address] Emergency Department, [address] [place] [address], [address] [place] [address] Obstetrics and Gynecology Call   [address] [address]  [address], [address] [place] [address] Rd, [address] [place] [address], [address] [phone]          Discharge Medication List as of 10/25/2022 11:54 AM      START taking these medications    Details   fluconazole (DIFLUCAN) 150 mg tablet Take 1 tablet (150 mg total) by mouth once for 1 dose Do not start before October 28, 2022 , Starting Fri 10/28/2022, Normal             No discharge procedures on file      PDMP Review     None          ED Provider  Electronically Signed by           Zacarias Farr PA-C  10/25/22 6983

## 2022-10-25 NOTE — DISCHARGE INSTRUCTIONS
Take fluconazole in 3 days as needed for persistent symptoms  Follow-up with OBGYN for further evaluation and monitoring of symptoms  Return to ED if symptoms worsen including abnormal vaginal bleeding or increased discharge, abdominal pain, fevers, pain with urination, blood in urine

## 2022-10-26 LAB
CANDIDA RRNA VAG QL PROBE: POSITIVE
G VAGINALIS RRNA GENITAL QL PROBE: NEGATIVE
T VAGINALIS RRNA GENITAL QL PROBE: NEGATIVE